# Patient Record
Sex: MALE | URBAN - METROPOLITAN AREA
[De-identification: names, ages, dates, MRNs, and addresses within clinical notes are randomized per-mention and may not be internally consistent; named-entity substitution may affect disease eponyms.]

---

## 2018-06-20 ENCOUNTER — HOSPITAL ENCOUNTER (OUTPATIENT)
Dept: MEDSURG UNIT | Facility: HOSPITAL | Age: 68
End: 2018-06-22
Attending: INTERNAL MEDICINE | Admitting: INTERNAL MEDICINE

## 2018-06-20 LAB
ABS NEUT (OLG): 2.99 X10(3)/MCL (ref 2.1–9.2)
ALBUMIN SERPL-MCNC: 4 GM/DL (ref 3.4–5)
ALBUMIN/GLOB SERPL: 1 RATIO (ref 1–2)
ALP SERPL-CCNC: 83 UNIT/L (ref 45–117)
ALT SERPL-CCNC: 22 UNIT/L (ref 12–78)
AMYLASE SERPL-CCNC: 68 UNIT/L (ref 25–115)
APPEARANCE, UA: CLEAR
AST SERPL-CCNC: 25 UNIT/L (ref 15–37)
BACTERIA #/AREA URNS AUTO: ABNORMAL /[HPF]
BASOPHILS # BLD AUTO: 0.01 X10(3)/MCL
BASOPHILS NFR BLD AUTO: 0 %
BILIRUB SERPL-MCNC: 0.7 MG/DL (ref 0.2–1)
BILIRUB UR QL STRIP: NEGATIVE
BILIRUBIN DIRECT+TOT PNL SERPL-MCNC: 0.2 MG/DL
BILIRUBIN DIRECT+TOT PNL SERPL-MCNC: 0.5 MG/DL
BUN SERPL-MCNC: 29 MG/DL (ref 7–18)
CALCIUM SERPL-MCNC: 8.4 MG/DL (ref 8.5–10.1)
CHLORIDE SERPL-SCNC: 106 MMOL/L (ref 98–107)
CO2 SERPL-SCNC: 25 MMOL/L (ref 21–32)
COLOR UR: COLORLESS
CREAT SERPL-MCNC: 2 MG/DL (ref 0.6–1.3)
CREAT UR-MCNC: 27 MG/DL
D DIMER PPP IA.FEU-MCNC: 0.95 MCG/ML FEU
EOSINOPHIL # BLD AUTO: 0.1 X10(3)/MCL
EOSINOPHIL NFR BLD AUTO: 2 %
ERYTHROCYTE [DISTWIDTH] IN BLOOD BY AUTOMATED COUNT: 13.6 % (ref 11.5–14.5)
GLOBULIN SER-MCNC: 4 GM/ML (ref 2.3–3.5)
GLUCOSE (UA): NORMAL
GLUCOSE SERPL-MCNC: 90 MG/DL (ref 74–106)
HCT VFR BLD AUTO: 45.2 % (ref 40–51)
HGB BLD-MCNC: 15.9 GM/DL (ref 13.5–17.5)
HGB UR QL STRIP: 0.03 MG/DL
HYALINE CASTS #/AREA URNS LPF: ABNORMAL /[LPF]
IMM GRANULOCYTES # BLD AUTO: 0.02 10*3/UL
IMM GRANULOCYTES NFR BLD AUTO: 0 %
KETONES UR QL STRIP: NEGATIVE
LEUKOCYTE ESTERASE UR QL STRIP: NEGATIVE
LIPASE SERPL-CCNC: 139 UNIT/L (ref 73–393)
LYMPHOCYTES # BLD AUTO: 1.7 X10(3)/MCL
LYMPHOCYTES NFR BLD AUTO: 31 % (ref 13–40)
MCH RBC QN AUTO: 29.8 PG (ref 26–34)
MCHC RBC AUTO-ENTMCNC: 35.2 GM/DL (ref 31–37)
MCV RBC AUTO: 84.8 FL (ref 80–100)
MONOCYTES # BLD AUTO: 0.7 X10(3)/MCL
MONOCYTES NFR BLD AUTO: 13 % (ref 4–12)
NEUTROPHILS # BLD AUTO: 2.99 X10(3)/MCL
NEUTROPHILS NFR BLD AUTO: 54 X10(3)/MCL
NITRITE UR QL STRIP: NEGATIVE
PH UR STRIP: 6.5 [PH] (ref 4.5–8)
PLATELET # BLD AUTO: 142 X10(3)/MCL (ref 130–400)
PMV BLD AUTO: 10.4 FL (ref 7.4–10.4)
POC TROPONIN: 0.05 NG/ML (ref 0–0.08)
POTASSIUM SERPL-SCNC: 4.1 MMOL/L (ref 3.5–5.1)
PROT SERPL-MCNC: 8 GM/DL (ref 6.4–8.2)
PROT UR QL STRIP: 100 MG/DL
RBC # BLD AUTO: 5.33 X10(6)/MCL (ref 4.5–5.9)
RBC #/AREA URNS AUTO: ABNORMAL /[HPF]
SODIUM SERPL-SCNC: 140 MMOL/L (ref 136–145)
SP GR UR STRIP: 1.01 (ref 1–1.03)
SQUAMOUS #/AREA URNS LPF: ABNORMAL /[LPF]
TROPONIN I SERPL-MCNC: 0.14 NG/ML (ref 0–0.05)
UROBILINOGEN UR STRIP-ACNC: NORMAL
WBC # SPEC AUTO: 5.5 X10(3)/MCL (ref 4.5–11)
WBC #/AREA URNS AUTO: ABNORMAL /HPF

## 2018-06-21 LAB
ABS NEUT (OLG): 3.23 X10(3)/MCL (ref 2.1–9.2)
ALBUMIN SERPL-MCNC: 3.7 GM/DL (ref 3.4–5)
ALBUMIN/GLOB SERPL: 1 RATIO (ref 1–2)
ALP SERPL-CCNC: 78 UNIT/L (ref 45–117)
ALT SERPL-CCNC: 21 UNIT/L (ref 12–78)
AST SERPL-CCNC: 24 UNIT/L (ref 15–37)
BASOPHILS # BLD AUTO: 0.01 X10(3)/MCL
BASOPHILS NFR BLD AUTO: 0 %
BILIRUB SERPL-MCNC: 0.7 MG/DL (ref 0.2–1)
BILIRUBIN DIRECT+TOT PNL SERPL-MCNC: 0.2 MG/DL
BILIRUBIN DIRECT+TOT PNL SERPL-MCNC: 0.5 MG/DL
BUN SERPL-MCNC: 28 MG/DL (ref 7–18)
CALCIUM SERPL-MCNC: 8.4 MG/DL (ref 8.5–10.1)
CHLORIDE SERPL-SCNC: 103 MMOL/L (ref 98–107)
CO2 SERPL-SCNC: 25 MMOL/L (ref 21–32)
CREAT SERPL-MCNC: 2.1 MG/DL (ref 0.6–1.3)
CREAT SERPL-MCNC: 2.1 MG/DL (ref 0.6–1.3)
CREAT UR-MCNC: 99 MG/DL
EOSINOPHIL # BLD AUTO: 0.12 X10(3)/MCL
EOSINOPHIL NFR BLD AUTO: 2 %
ERYTHROCYTE [DISTWIDTH] IN BLOOD BY AUTOMATED COUNT: 13.6 % (ref 11.5–14.5)
FESODIUM % (OHS): 2 %
GLOBULIN SER-MCNC: 4 GM/ML (ref 2.3–3.5)
GLUCOSE SERPL-MCNC: 95 MG/DL (ref 74–106)
HAV IGM SERPL QL IA: NONREACTIVE
HBV CORE IGM SERPL QL IA: NONREACTIVE
HBV SURFACE AG SERPL QL IA: NEGATIVE
HCT VFR BLD AUTO: 44.9 % (ref 40–51)
HCV AB SERPL QL IA: NONREACTIVE
HGB BLD-MCNC: 15.6 GM/DL (ref 13.5–17.5)
IMM GRANULOCYTES # BLD AUTO: 0.01 10*3/UL
IMM GRANULOCYTES NFR BLD AUTO: 0 %
LYMPHOCYTES # BLD AUTO: 1.82 X10(3)/MCL
LYMPHOCYTES NFR BLD AUTO: 30 % (ref 13–40)
MCH RBC QN AUTO: 29.8 PG (ref 26–34)
MCHC RBC AUTO-ENTMCNC: 34.7 GM/DL (ref 31–37)
MCV RBC AUTO: 85.7 FL (ref 80–100)
MONOCYTES # BLD AUTO: 0.81 X10(3)/MCL
MONOCYTES NFR BLD AUTO: 14 % (ref 4–12)
NEUTROPHILS # BLD AUTO: 3.23 X10(3)/MCL
NEUTROPHILS NFR BLD AUTO: 54 X10(3)/MCL
OSMOLALITY UR: 447 MOSM/KG (ref 300–1300)
PLATELET # BLD AUTO: 154 X10(3)/MCL (ref 130–400)
PMV BLD AUTO: 10.5 FL (ref 7.4–10.4)
POTASSIUM SERPL-SCNC: 3.6 MMOL/L (ref 3.5–5.1)
PROT SERPL-MCNC: 7.7 GM/DL (ref 6.4–8.2)
RBC # BLD AUTO: 5.24 X10(6)/MCL (ref 4.5–5.9)
SODIUM SERPL-SCNC: 139 MMOL/L (ref 136–145)
SODIUM SERPL-SCNC: 139 MMOL/L (ref 136–145)
SODIUM UR-SCNC: 100 MMOL/L
TROPONIN I SERPL-MCNC: 0.15 NG/ML (ref 0–0.05)
TROPONIN I SERPL-MCNC: 0.15 NG/ML (ref 0–0.05)
TSH SERPL-ACNC: 2.65 MIU/L (ref 0.36–3.74)
UUN UR-MCNC: 1028 MG/DL
WBC # SPEC AUTO: 6 X10(3)/MCL (ref 4.5–11)

## 2022-04-10 ENCOUNTER — HISTORICAL (OUTPATIENT)
Dept: ADMINISTRATIVE | Facility: HOSPITAL | Age: 72
End: 2022-04-10

## 2022-04-29 VITALS
WEIGHT: 175.06 LBS | DIASTOLIC BLOOD PRESSURE: 68 MMHG | HEIGHT: 67 IN | BODY MASS INDEX: 27.48 KG/M2 | SYSTOLIC BLOOD PRESSURE: 186 MMHG

## 2022-04-30 NOTE — ED PROVIDER NOTES
Patient:   Gonzalez Walters             MRN: [id number]            FIN: [id number]               Age:   68 years     Sex:  Male     :  1950   Associated Diagnoses:   CHF, acute on chronic   Author:   Carlos Alberto Dunaway MD      Basic Information   Time seen: Date 2018, Immediately upon arrival.   History source: Patient.   Arrival mode: Private vehicle.   Additional information: Chief Complaint from Nursing Triage Note : Chief Complaint   2018 14:09 CDT      Chief Complaint           abdominal pain with SOB x 1 month; denies nausea/vomiting/visual changes; reports taking b/p meds today  .      History of Present Illness   The patient presents with abdominal pain and gradual worsening of abdominal pain for the past month.  The onset was 4  weeks ago and chronic.  The course/duration of symptoms is fluctuating in intensity.  The character of symptoms is crampy.  The degree at onset was minimal.  The Location of pain at onset was no pain at present.  The degree at present is none.  The Location of pain at present is abdominal and mid epigastric.  Radiating pain: none. The exacerbating factor is eating.  The relieving factor is rest.  Therapy today: none.  Risk factors consist of hypertension.  Associated symptoms: none.  Additional history: none.        Review of Systems   Constitutional symptoms:  No fever, no weakness, no fatigue, no decreased activity.    Skin symptoms:  No jaundice, no rash, no pruritus.    Eye symptoms:  No recent vision problems,    ENMT symptoms:  No sore throat, no nasal congestion.    Respiratory symptoms:  No orthopnea, no sputum production.    Cardiovascular symptoms:  No chest pain, no palpitations, no syncope, no diaphoresis.    Gastrointestinal symptoms:  Abdominal pain, mild, epigastric, sharp, no nausea, no vomiting, no diarrhea.    Genitourinary symptoms:  No dysuria,    Musculoskeletal symptoms:  No back pain, no Muscle pain, no Joint pain.    Neurologic symptoms:  No  headache, no dizziness, no altered level of consciousness.    Psychiatric symptoms:  No anxiety, no depression, no sleeping problems, no substance abuse.    Endocrine symptoms:  No polyuria, no polydipsia, no polyphagia, no hyperglycemia.    Hematologic/Lymphatic symptoms:  Bleeding tendency negative, bruising tendency negative, no petechiae, no gum bleeding.    Allergy/immunologic symptoms:  No food allergies, no recurrent infections, no impaired immunity.              Additional review of systems information: All other systems reviewed and otherwise negative.      Health Status   Allergies:    Allergic Reactions (Selected)  No Known Medication Allergies,    Allergies (1) Active Reaction  No Known Medication Allergies None Documented  .   Medications:  (Selected)   Documented Medications  Documented  furosemide 20 mg oral tablet: 20 mg = 1 tab(s), Oral, Daily, # 30 tab(s), 0 Refill(s)  olmesartan 40 mg oral tablet: 40 mg = 1 tab(s), Oral, Daily, # 30 tab(s), 0 Refill(s).      Past Medical/ Family/ Social History   Medical history:    No active or resolved past medical history items have been selected or recorded., Reviewed as documented in chart.   Surgical history:    No active procedure history items have been selected or recorded., Reviewed as documented in chart.   Family history:    No family history items have been selected or recorded., Reviewed as documented in chart.   Social history: Reviewed as documented in chart.   Problem list:    Active Problems (1)  HTN - Hypertension   .      Physical Examination               Vital Signs             Time:  6/20/2018 14:32:00.   Vital Signs   6/20/2018 14:09 CDT      Temperature Oral          36.5 DegC                             Temperature Oral (calculated)             97.70 DegF                             Peripheral Pulse Rate     105 bpm  HI                             Respiratory Rate          20 br/min                             SpO2                      97  %                             Oxygen Therapy            Room air                             Systolic Blood Pressure   208 mmHg  HI                             Diastolic Blood Pressure  130 mmHg  HI  .      Vital Signs (last 24 hrs)_____  Last Charted___________  Temp Oral     36.5 DegC  (JUN 20 14:09)  Heart Rate Peripheral   H 105bpm  (JUN 20 14:09)  Resp Rate         20 br/min  (JUN 20 14:09)  SBP      H 208mmHg  (JUN 20 14:09)  DBP      H 130mmHg  (JUN 20 14:09)  SpO2      97 %  (JUN 20 14:09)  .   Measurements   6/20/2018 14:09 CDT      Weight Dosing             88 kg                             Weight Measured and Calculated in Lbs     194.00 lb                             Weight Estimated          88 kg                             Height/Length Dosing      176 cm                             Height/Length Estimated   176 cm                             Body Mass Index Estimated 28.41 kg/m2  .   Basic Oxygen Information   6/20/2018 14:09 CDT      SpO2                      97 %                             Oxygen Therapy            Room air  .   General:  Alert, no acute distress.    Skin:  Warm, pink, intact, moist, no pallor, no rash, normal for ethnicity.    Head:  Normocephalic, atraumatic.    Neck:  Supple, trachea midline, no tenderness, no JVD, no carotid bruit.    Eye:  Pupils are equal, round and reactive to light, extraocular movements are intact, normal conjunctiva.    Ears, nose, mouth and throat:  Tympanic membranes clear, oral mucosa moist, no pharyngeal erythema or exudate.    Cardiovascular:  Regular rate and rhythm, No murmur, Normal peripheral perfusion, No edema.    Respiratory:  Lungs are clear to auscultation, respirations are non-labored, breath sounds are equal, Symmetrical chest wall expansion.    Chest wall:  No tenderness, No deformity.    Back:  Nontender, Normal range of motion, Normal alignment, no step-offs.    Musculoskeletal:  Normal ROM, normal strength, no tenderness, no swelling.     Gastrointestinal:  Soft, Non distended, Tenderness: Mild discomfort in mid epigastric and RUQ, Guarding: Negative, Rebound: Negative, Bowel sounds: Normal, Organomegaly: Negative.    Genitourinary   Neurological:  Alert and oriented to person, place, time, and situation, No focal neurological deficit observed, CN II-XII intact, normal sensory observed, normal motor observed.    Lymphatics:  No lymphadenopathy.   Psychiatric:  Cooperative, appropriate mood & affect, normal judgment, non-suicidal.       Medical Decision Making   Electrocardiogram:  Time 6/20/2018 14:34:00, rate 102, EP Interp, RBBB with RAD and Old Anterior MI.    Results review:     No qualifying data available.      Reexamination/ Reevaluation   Vital signs   Basic Oxygen Information   6/20/2018 14:09 CDT      SpO2                      97 %                             Oxygen Therapy            Room air        Impression and Plan   Diagnosis   Diagnosis   CHF, acute on chronic ([id number] I50.9)      Calls-Consults   -  6/20/2018 16:45:00 , TELE on Call, consult.    Plan   Condition: Improved, Stable.    Disposition: Medically cleared, Admit time  6/20/2018 16:09:00, Admit to Inpatient Telemetry Unit, Dispositioned by: Time: 6/20/2018 16:09:00, Gume BRAGA, Carlos Alberto Ricketts   Patient was given the following educational materials: Pt. education, Pt. education.    Follow up with: [facility].    Counseled: Patient, Regarding diagnosis, Regarding treatment plan, Patient indicated understanding of instructions.

## 2022-05-02 NOTE — HISTORICAL [facility] CERNER
This is a historical note converted from [facility]. Formatting and pictures may have been removed.  Please reference [facility] for original formatting and attached multimedia. Chief Complaint  abdominal pain with SOB x 1 month; denies nausea/vomiting/visual changes; reports taking b/p meds today  History of Present Illness  This is a 68-year-old  male?with past medical history of hypertension?who presents to [facility]?complaints of shortness of breath?and abdominal fullness?has been present for at least 2 months with progressive?symptoms.? Patient states?via?his son-in-law?as the??he has had shortness of breath?for at least 2 months that has progressively worsened?and was having difficulty?breathing last night prompting him to come to the ED.? He also states he has occasional abdominal swelling which is worse with heavy meals.? Patient confirms symptoms of minimal lower extremity swelling,?paroxysmal nocturnal dyspnea, and mild orthopnea. Patient?denies chest pain, lower extremity edema,?cough, abdominal pain, nausea, vomiting, cough, dizziness, headache. ?Patient states that his only past medical history is hypertension and he has been prescribed an ARB?for which he is compliant with. Internal medicine consulted for further management. Chest x-ray with cardiomegaly and no acute cardiopulmonary process is identified.? Abdominal X ray reveals nonobstructive bowel gas pattern.? Patient presented with initial blood pressure of 208/130 in the setting of [place] and elevated troponin 0.136 this is hypertensive emergency.? Patient with O2 sats above 95% on room air and tachycardic with elevated d-dimer 0.9 so V/Q scan ordered by ER with read pending.  ?  ?  Review of Systems  Constitutional: no weightloss,?fever, chills  Respiratory:+ SOB  Cardiovascular: no chest pain  Gastrointestinal: No n/v or abd pain  Genitourinary: No burning on urination.  Musculoskeletal: No muscle or?back pain  Integumentary: No  rash  Neurologic: No headache, dizziness, or?syncope.  All Other ROS_  ?  Physical Exam  Vitals & Measurements  T:?37.0? ?C (Oral)? TMIN:?36.5? ?C (Oral)? TMAX:?37.0? ?C (Oral)? HR:?78(Peripheral)? RR:?20? BP:?166/100? SpO2:?97%? WT:?88?kg? WT:?77.4?kg?  General: AAOx3, NAD, alert and cooperative  HEENT: PERRLA, EOMI  Neck: no LAD, + JVD mónica with HJR  CVS: S1/S2 nml, RRR, no murmurs  Resp: CTA b/l, no wheezing  GI: soft, distention with tympany, fluid wave present, ascitics  : No CVA tenderness  Skin: not jaundiced, warm  Musculoskeletal: normal ROM, no joint tenderness, normal muscular development  Extremities: trace peripheral edema mónica LE, radial and pedal pulses intact and equal  Neuro:?strength and sensation symmetric and intact throughout, no focal neurological deficits  ?  Assessment/Plan  Acute CHF exacerbation  ?-Patient has symptoms paroxysmal nocturnal dyspnea?and orthopnea?with shortness of breath and dyspnea on exertion  ?- can speak in full scentences and has  ?-Has chronic?likely uncontrolled hypertension with presentation of 208/130 today  ?-BNP 10,000, +?hepatojugular reflex,?with JVD, wet and warm, with abdominal ascitics and pt denies heavy?alcohol use  ?-Chest x-ray with cardiomegaly and no acute cardiopulmonary processes  ?-Echo pending,?IV Lasix, ARB  ?  Troponemia  ?-Initial troponin 0.136  ?-Likely secondary to demand ischemia with hypertensive urgency  ?-Troponin?trended every 6 hours ?3  ?-EKG  ??  ?hypertensive?emergency  ? - Patient has chronic uncontrolled hypertension?with hypertensive urgency?on presentation 208/130  ?  Rule out PE  ? -Patient with elevated d-dimer?0.95 in the setting of severe shortness of breath  ??-VQ scan ordered by ER. ?Results pending  ? -Patients?started on full dose Lovenox  ?  [place]  ? -Creatinine 2.0, baseline unknown  ? -Could be?secondary to fluid overload  ? - f/u renal work up  ?  ?  ?  ?  DVT prophylaxis: Full dose Lovenox  ?  ?   Problem List/Past  Medical History  Ongoing  HTN - Hypertension  Procedure/Surgical History  No reported surgeries.  Medications  Inpatient  candesartan 4 mg oral tablet, 8 mg= 2 tab(s), Oral, Daily  Lovenox 100 mg/mL subcutaneous solution, 90 mg= 0.9 mL, 1 mg/kg, Subcutaneous, BID  Home  furosemide 20 mg oral tablet, 20 mg= 1 tab(s), Oral, Daily  olmesartan 40 mg oral tablet, 40 mg= 1 tab(s), Oral, Daily  Allergies  No Known Medication Allergies  Social History  Alcohol  Current, 06/20/2018  Home/Environment  Lives with Children., 06/20/2018  Substance Abuse  Never, 06/20/2018  Tobacco  Never smoker Use:., 06/20/2018  ?  Drinks socially?less than 3?drinks a month  denies tobacco use  ?  Family History  unknown.  Lab Results  General Lab  BUN:?29 mg/dL?High (06/20/18 14:45:00 CDT)  Creatinine:?2 mg/dL?High (06/20/18 14:45:00 CDT)  Glucose Lvl: 90 mg/dL (06/20/18 14:45:00 CDT)  Hct: 45.2 % (06/20/18 14:45:00 CDT)  Hgb: 15.9 gm/dL (06/20/18 14:45:00 CDT)  WBC: 5.5 x10(3)/mcL (06/20/18 14:45:00 CDT)  Platelet: 142 x10(3)/mcL (06/20/18 14:45:00 CDT)  Potassium Lvl: 4.1 mmol/L (06/20/18 14:45:00 CDT)  Sodium Lvl: 140 mmol/L (06/20/18 14:45:00 CDT)  Diagnostic Results  Accession:?[id number]  Order:?XR Abdomen Flat and Erect  Report Dt/Tm:?06/20/2018 15:15  Report:?  Clinical History:  Abdominal pain  ?  Technique:  Flat and upright imaging of abdomen.  ?  Comparison:  No priors.  ?  Findings:  Nonobstructive bowel gas pattern. Degenerative changes of the spine.  ?  Impression:  Nonobstructive bowel gas pattern. Degenerative changes of the spine.  ?  ?  Accession:?[id number]  Order:?XR Chest 2 Views  Report Dt/Tm:?06/20/2018 15:11  Report:?  Clinical History  Cough  ?  Technique  2 views of the chest.  ?  Comparison  No prior imaging available for comparison.  ?  Findings  Lungs are clear with no visualized focal airspace opacity.  The trachea appears midline.  The cardiomediastinal silhouette is prominent.  There is no evidence of  pneumothorax or pleural effusion.  Visualized abdomen, soft tissues, and osseous structures are  unremarkable.  ?  Impression  Cardiomegaly with no acute cardiopulmonary process.  ?  ?  ?      I have seen the patient with the residents, re assessed the historical, physical, laboratory, and radiologic findings.  I have discussed the case with the residents and made recommendations.